# Patient Record
Sex: FEMALE | Race: BLACK OR AFRICAN AMERICAN | Employment: UNEMPLOYED | URBAN - METROPOLITAN AREA
[De-identification: names, ages, dates, MRNs, and addresses within clinical notes are randomized per-mention and may not be internally consistent; named-entity substitution may affect disease eponyms.]

---

## 2024-09-23 ENCOUNTER — HOSPITAL ENCOUNTER (EMERGENCY)
Facility: HOSPITAL | Age: 24
Discharge: HOME | End: 2024-09-23
Attending: EMERGENCY MEDICINE

## 2024-09-23 VITALS
DIASTOLIC BLOOD PRESSURE: 76 MMHG | TEMPERATURE: 99 F | SYSTOLIC BLOOD PRESSURE: 125 MMHG | WEIGHT: 157 LBS | OXYGEN SATURATION: 97 % | HEART RATE: 64 BPM | RESPIRATION RATE: 20 BRPM

## 2024-09-23 DIAGNOSIS — L03.313 CELLULITIS OF CHEST WALL: Primary | ICD-10-CM

## 2024-09-23 DIAGNOSIS — L02.411 ABSCESS OF RIGHT AXILLA: ICD-10-CM

## 2024-09-23 LAB — B-HCG UR QL: NEGATIVE

## 2024-09-23 PROCEDURE — 81025 URINE PREGNANCY TEST: CPT

## 2024-09-23 PROCEDURE — 87186 SC STD MICRODIL/AGAR DIL: CPT | Performed by: EMERGENCY MEDICINE

## 2024-09-23 PROCEDURE — 87070 CULTURE OTHR SPECIMN AEROBIC: CPT | Performed by: EMERGENCY MEDICINE

## 2024-09-23 PROCEDURE — 96372 THER/PROPH/DIAG INJ SC/IM: CPT

## 2024-09-23 PROCEDURE — 99284 EMERGENCY DEPT VISIT MOD MDM: CPT

## 2024-09-23 PROCEDURE — 87077 CULTURE AEROBIC IDENTIFY: CPT | Performed by: EMERGENCY MEDICINE

## 2024-09-23 PROCEDURE — 99283 EMERGENCY DEPT VISIT LOW MDM: CPT

## 2024-09-23 PROCEDURE — 87205 SMEAR GRAM STAIN: CPT | Performed by: EMERGENCY MEDICINE

## 2024-09-23 RX ORDER — CEPHALEXIN 500 MG/1
500 CAPSULE ORAL ONCE
Status: DISCONTINUED | OUTPATIENT
Start: 2024-09-23 | End: 2024-09-23

## 2024-09-23 RX ORDER — KETOROLAC TROMETHAMINE 15 MG/ML
15 INJECTION, SOLUTION INTRAMUSCULAR; INTRAVENOUS ONCE
Status: COMPLETED | OUTPATIENT
Start: 2024-09-23 | End: 2024-09-23

## 2024-09-23 RX ORDER — SULFAMETHOXAZOLE/TRIMETHOPRIM 800-160 MG
1 TABLET ORAL ONCE
Status: COMPLETED | OUTPATIENT
Start: 2024-09-23 | End: 2024-09-23

## 2024-09-23 RX ORDER — IBUPROFEN 400 MG/1
400 TABLET, FILM COATED ORAL EVERY 8 HOURS PRN
Qty: 14 TABLET | Refills: 0 | Status: SHIPPED | OUTPATIENT
Start: 2024-09-23 | End: 2024-09-30

## 2024-09-23 RX ORDER — SULFAMETHOXAZOLE/TRIMETHOPRIM 800-160 MG
1 TABLET ORAL 2 TIMES DAILY
Qty: 20 TABLET | Refills: 0 | Status: SHIPPED | OUTPATIENT
Start: 2024-09-23 | End: 2024-10-03

## 2024-09-23 NOTE — ED INITIAL ASSESSMENT (HPI)
Patient complains of left sided breast pain with possible skin abscess/boil present. Denies fever

## 2024-09-23 NOTE — ED PROVIDER NOTES
[facility]  Emergency Department Attending Note     Chief Complaint:   Chief Complaint   Patient presents with    Breast Problem     HISTORY OF PRESENT ILLNESS:   Mary Diaz is a 23 year old female who presents to the ED with a past medical history including multiple abscesses in the past presents with a complaint of an area of redness and swelling to the left chest over the last few days.  She states she has had this before but has never had to have this drained.  She typically gets these wounds to her axilla and actually few days ago she had some swelling to the right axilla and earlier today it ruptured and drained quite a bit of purulent material and now the swelling is better.  Several months ago she was prescribed an antibiotic which she took, but she states it never completely cleared up, though it did improve.  Denies any allergy.  Denies fever chills or systemic complaint.  Denies nausea vomiting.  Denies pleuritic or exertional complaints.     MEDICAL & SOCIAL HISTORY:   History reviewed. No pertinent past medical history. History reviewed. No pertinent surgical history.   Social History     Socioeconomic History    Marital status: Single   Tobacco Use    Smoking status: Never    Smokeless tobacco: Never    No Known Allergies   Current Outpatient Medications   Medication Sig Dispense Refill    sulfamethoxazole-trimethoprim -160 MG Oral Tab per tablet Take 1 tablet by mouth 2 (two) times daily for 10 days. 20 tablet 0    ibuprofen 400 MG Oral Tab Take 1 tablet (400 mg total) by mouth every 8 (eight) hours as needed for Pain or Fever. 14 tablet 0          REVIEW OF SYSTEMS   A 10 point review of systems was completed and is negative except as listed in history of present illness      PHYSICAL EXAM   Vitals: /76   Pulse 64   Temp 97.3 °F (36.3 °C) (Temporal)   Resp 20   Wt 71.2 kg   LMP 09/20/2024 (Exact Date)   SpO2 97%   /76   Pulse 64   Temp 97.3 °F (36.3 °C) (Temporal)    Resp 20   Wt 71.2 kg   LMP 09/20/2024 (Exact Date)   SpO2 97%     General: A&Ox3, NAD  Constitutional: Well developed, well nourished, nontoxic  Head: atraumatic, normocephalic   Eyes: conjuctiva clear, no icterus, PERRL, EOMI, vision grossly normal  Ears: normal external appearance, no drainage  Nose:  Atraumatic, no swelling, no drainage, nares patent  Throat:  Moist pink mucous membranes, airway is patent  Neck:  Soft supple, no masses, no tracheal deviation, no stridor  Chest:  No bruising or abrasions no deformity  Cardiac:  Regular rate and rhyt  Lung:  No distress, no retractions. Clear to auscultation bilaterally, no w/r/r  Abdomen:  Soft, nontender, nondistended, normal BS  Back: No stepoff/deformity  Extremities: FROM all ext, no deformities, intact equal peripheral pulses, no cyanosis or edema the right axilla has an area of erythema and swelling that is actively draining some purulent material.  The swollen area is 2 cm x 1 cm and there is no crepitus on repeat examination there is no rapid spread.  To the left chest just left of the sternum and involving the medial left breast there is a 4 cm x 6 cm area of erythema that is somewhat warm to the touch, no fluctuance, no drainage, no palpable mass, no nipple discharge   Neuro: No facial droop, no slurred speech, moving all extremities freely  Psych: A&Ox3, normal affect, cooperative, calm  Skin: no petechiae/purpura, warm, dry      RESULTS  LABS:   Results for orders placed or performed during the hospital encounter of 09/23/24   POCT Pregnancy, Urine   Result Value Ref Range    POCT Urine Pregnancy Negative Negative         IMAGING: No results found.        Procedures:   Procedures       ED COURSE          Re-Evaluation: improved      Disposition & Plan:   Clinical Impression/Final Diagnosis:   1. Cellulitis of chest wall    2. Abscess of right axilla        Medical Decision Making: The patient has a draining abscess to the right axilla which is  not amenable to incision and drainage given that it is already actively draining and is no longer fluctuant.  She also has a patch of erythema which is consistent with cellulitis to the the left chest wall involving the medial breast.  There is no fluctuance to suggest an abscess at this time and there is no drainable fluid collection on my exam.  I had a long discussion with the patient and she assures me she will follow-up with her doctor in the next day or 2 and I will also refer her to by Dr. Nicole as she does live in a truck and is quite mobile.  On repeat examination there is no rapid spread.  The patient is afebrile and not tachycardic.  She is nontoxic in appearance.  She assures me she will return immediately for any worsening of symptoms or new concerns and she will watch the erythema and if it spreads she will come back to the emergency department immediately.  Initially I had ordered Bactrim and Keflex for the patient, later the ED pharmacist discussed with me doing wound culture to the right axilla and starting just Bactrim.  I discussed this plan with the patient and she is also comfortable with it.  First dose given here in the emergency department will prescribe    Medical Decision Making  Amount and/or Complexity of Data Reviewed  Independent Historian: spouse  External Data Reviewed: notes.  Discussion of management or test interpretation with external provider(s): Discussed with the ED pharmacist Jc who made antibiotic recommendations    Risk  OTC drugs.  Prescription drug management.  Decision regarding hospitalization.        Disposition: Discharge  There are no disposition comments on file for this visit.     This note was generated in part using voice recognition dictation technology. The report was reviewed by this physician but still may have unintentional errors due to inherent limitations of voice recognition technology. All times are estimates.

## 2024-09-25 ENCOUNTER — HOSPITAL ENCOUNTER (EMERGENCY)
Facility: HOSPITAL | Age: 24
Discharge: HOME | End: 2024-09-25
Attending: EMERGENCY MEDICINE

## 2024-09-25 VITALS
RESPIRATION RATE: 22 BRPM | OXYGEN SATURATION: 98 % | HEIGHT: 67 IN | WEIGHT: 157 LBS | DIASTOLIC BLOOD PRESSURE: 75 MMHG | TEMPERATURE: 97 F | SYSTOLIC BLOOD PRESSURE: 121 MMHG | BODY MASS INDEX: 24.64 KG/M2 | HEART RATE: 77 BPM

## 2024-09-25 DIAGNOSIS — N61.1 BREAST ABSCESS: Primary | ICD-10-CM

## 2024-09-25 DIAGNOSIS — N89.8 VAGINAL DISCHARGE: ICD-10-CM

## 2024-09-25 LAB
B-HCG UR QL: NEGATIVE
BILIRUB UR QL: NEGATIVE
BV BACTERIA DNA VAG QL NAA+PROBE: NEGATIVE
C GLABRATA DNA VAG QL NAA+PROBE: NEGATIVE
C KRUSEI DNA VAG QL NAA+PROBE: NEGATIVE
C TRACH DNA SPEC QL NAA+PROBE: NEGATIVE
CANDIDA DNA VAG QL NAA+PROBE: NEGATIVE
CLARITY UR: CLEAR
COLOR UR: COLORLESS
GLUCOSE UR-MCNC: NORMAL MG/DL
HGB UR QL STRIP.AUTO: NEGATIVE
KETONES UR-MCNC: NEGATIVE MG/DL
LEUKOCYTE ESTERASE UR QL STRIP.AUTO: NEGATIVE
N GONORRHOEA DNA SPEC QL NAA+PROBE: NEGATIVE
NITRITE UR QL STRIP.AUTO: NEGATIVE
PH UR: 6 [PH] (ref 5–8)
PROT UR-MCNC: NEGATIVE MG/DL
SP GR UR STRIP: <1.005 (ref 1–1.03)
T VAGINALIS DNA VAG QL NAA+PROBE: NEGATIVE
UROBILINOGEN UR STRIP-ACNC: NORMAL

## 2024-09-25 PROCEDURE — 99284 EMERGENCY DEPT VISIT MOD MDM: CPT

## 2024-09-25 PROCEDURE — 81514 NFCT DS BV&VAGINITIS DNA ALG: CPT | Performed by: EMERGENCY MEDICINE

## 2024-09-25 PROCEDURE — 87205 SMEAR GRAM STAIN: CPT | Performed by: EMERGENCY MEDICINE

## 2024-09-25 PROCEDURE — 87591 N.GONORRHOEAE DNA AMP PROB: CPT | Performed by: EMERGENCY MEDICINE

## 2024-09-25 PROCEDURE — 10060 I&D ABSCESS SIMPLE/SINGLE: CPT

## 2024-09-25 PROCEDURE — 81025 URINE PREGNANCY TEST: CPT

## 2024-09-25 PROCEDURE — 87077 CULTURE AEROBIC IDENTIFY: CPT | Performed by: EMERGENCY MEDICINE

## 2024-09-25 PROCEDURE — 87491 CHLMYD TRACH DNA AMP PROBE: CPT | Performed by: EMERGENCY MEDICINE

## 2024-09-25 PROCEDURE — 81003 URINALYSIS AUTO W/O SCOPE: CPT | Performed by: EMERGENCY MEDICINE

## 2024-09-25 PROCEDURE — 87808 TRICHOMONAS ASSAY W/OPTIC: CPT | Performed by: EMERGENCY MEDICINE

## 2024-09-25 PROCEDURE — 87070 CULTURE OTHR SPECIMN AEROBIC: CPT | Performed by: EMERGENCY MEDICINE

## 2024-09-25 RX ORDER — LIDOCAINE HYDROCHLORIDE 10 MG/ML
20 INJECTION, SOLUTION EPIDURAL; INFILTRATION; INTRACAUDAL; PERINEURAL ONCE
Status: COMPLETED | OUTPATIENT
Start: 2024-09-25 | End: 2024-09-25

## 2024-09-25 RX ORDER — METRONIDAZOLE 500 MG/1
500 TABLET ORAL 2 TIMES DAILY
Qty: 14 TABLET | Refills: 0 | Status: SHIPPED | OUTPATIENT
Start: 2024-09-25 | End: 2024-10-02

## 2024-09-25 RX ORDER — LIDOCAINE HYDROCHLORIDE 10 MG/ML
INJECTION, SOLUTION EPIDURAL; INFILTRATION; INTRACAUDAL; PERINEURAL
Status: COMPLETED
Start: 2024-09-25 | End: 2024-09-25

## 2024-09-25 NOTE — PROGRESS NOTES
ED Culture Callback Results Review    Pharmacist reviewed culture results from ED visit .    Final wound culture positive for P. mirabilis. Patient was prescribed Sulfamethoxazole/Trimethoprim (Bactrim) on discharge. Current therapy is appropriate based on reported susceptibilities. No further intervention required at this time.      Jc Dillard PharmD  Emergency Medicine Pharmacist Specialist  09/25/24; 11:46 AM

## 2024-09-25 NOTE — ED PROVIDER NOTES
Patient Seen in: [facility] Emergency Department      History     Chief Complaint   Patient presents with    Abscess     Stated Complaint: Abscess    Subjective:   HPI    23-year-old female without significant past medical history presents with complaints of left breast pain and redness.  The patient reports increasing pain and redness to the medial aspect of her left breast.  She was seen in this emergency department 2 days ago and was given Bactrim for suspected cellulitis.  She was advised to return if symptoms were worsening.  She reports increased pain with some swelling to the area of the left breast.  She denies any drainage from the area.  The patient also complains of vaginal discharge over the past 2 days.  She reports brownish discharge along with some vaginal discomfort.  She denies abdominal pain.  No known fevers.    Objective:   History reviewed. No pertinent past medical history.           History reviewed. No pertinent surgical history.             Social History     Socioeconomic History    Marital status: Single   Tobacco Use    Smoking status: Some Days     Types: Cigarettes    Smokeless tobacco: Never   Vaping Use    Vaping status: Never Used   Substance and Sexual Activity    Alcohol use: Never    Drug use: Never              Review of Systems    Positive for stated Chief Complaint: Abscess    Other systems are as noted in HPI.  Constitutional and vital signs reviewed.      All other systems reviewed and negative except as noted above.    Physical Exam     ED Triage Vitals [09/25/24 0753]   /80   Pulse 88   Resp 20   Temp 97.3 °F (36.3 °C)   Temp src Temporal   SpO2 97 %   O2 Device None (Room air)       Current Vitals:   Vital Signs  BP: 121/75  Pulse: 77  Resp: 22  Temp: 97.3 °F (36.3 °C)  Temp src: Temporal  MAP (mmHg): 90    Oxygen Therapy  SpO2: 98 %  O2 Device: None (Room air)            Physical Exam      General Appearance: alert, no distress  Eyes: pupils equal and round  no pallor or injection  ENT, Mouth: mucous membranes moist  Respiratory: there are no retractions, lungs are clear to auscultation  Breast: There is induration, erythema, and mild swelling noted to the medial aspect of the left breast extending from the medial edge of the breast towards the edge of the areola.  There is an area of induration and increased tenderness towards the medial aspect of the breast.  No active drainage.  No axillary lymphadenopathy noted.  Cardiovascular: regular rate and rhythm  Gastrointestinal:  abdomen is soft and non tender, no masses, bowel sounds normal  Pelvic exam:  The vulva was normal no lesions.  The vagina had a small amount of brownish discharge.  The cervix was closed with no bleeding and a small amount of brownish discharge.  The uterus was normal size and non tender.  The adnexa had no masses and no tenderness.  The exam was performed with a chaperone.  Neurological: Speech normal.  Moving extremities x 4.  Skin: warm and dry, no rashes.  Musculoskeletal: neck is supple non tender        Extremities are symmetrical, full range of motion  Psychiatric: patient is oriented X 3, there is no agitation    DIFFERENTIAL DIAGNOSIS: After history and physical exam differential diagnosis was considered for breast abscess, BV, trichomonas, or other        ED Course     Labs Reviewed   URINALYSIS WITH CULTURE REFLEX - Abnormal; Notable for the following components:       Result Value    Urine Color Colorless (*)     Spec Gravity <1.005 (*)     All other components within normal limits   POCT PREGNANCY URINE - Normal   TRICHOMONAS VAGINALIS ANTIGEN SCREEN - Normal   VAGINITIS VAGINOSIS PCR PANEL   CHLAMYDIA/GONOCOCCUS, IMANI   AEROBIC BACTERIAL CULTURE                    MDM      Discussed with the patient that she likely has a breast abscess and would recommend surgical evaluation and drainage.  The patient states that she is on the road and travels with her boyfriend who works as a  long-.  She states she is infrequently in any place for more than a few days.  She states she would like the abscess drained now in the emergency department.  Will drain the abscess and recommend surgical follow-up either here or wherever the patient is next located.    Procedure:    Abscess drainage:  The patient abscess was located left breast.  I obtained verbal consent from the patient to drain the abscess who was informed about the possibility of bleeding, pain and worsening of the condition. The abscess was incised with a scalpel and a moderate amount of purulent drainage was expressed.  I irrigated the wound and placed some packing.  The patient tolerated the procedure well.  The procedure was performed by myself.    Patient with brownish vaginal discharge.  No cervical motion tenderness.  Suspect trichomonas or bacterial vaginosis.  Will give empiric antibiotics.  Will recommend that the Patient continue Bactrim as prescribed 2 days ago.  A culture of the abscess was sent.  Will give general surgery follow-up for packing removal and reevaluation.                                 Medical Decision Making      Disposition and Plan     Clinical Impression:  1. Breast abscess    2. Vaginal discharge         Disposition:  Discharge  9/25/2024 11:40 am    Follow-up:  Ela Ascencio MD  [address] [place] [address], [place] [address]  [phone]    Follow up            Medications Prescribed:  Current Discharge Medication List        START taking these medications    Details   metRONIDAZOLE 500 MG Oral Tab Take 1 tablet (500 mg total) by mouth in the morning and 1 tablet (500 mg total) before bedtime. Do all this for 7 days.  Qty: 14 tablet, Refills: 0

## 2024-09-25 NOTE — DISCHARGE INSTRUCTIONS
Keep packing in place.  Continue Bactrim as previously prescribed.  Take additional antibiotic as prescribed.  Follow-up with general surgery for packing removal and wound recheck.  Return to the emergency department if increasing pain, fever, or other new symptoms develop.